# Patient Record
Sex: FEMALE | Race: WHITE | NOT HISPANIC OR LATINO | Employment: FULL TIME | URBAN - METROPOLITAN AREA
[De-identification: names, ages, dates, MRNs, and addresses within clinical notes are randomized per-mention and may not be internally consistent; named-entity substitution may affect disease eponyms.]

---

## 2023-03-29 PROBLEM — J30.9 ALLERGIC RHINITIS: Status: ACTIVE | Noted: 2023-03-29

## 2023-03-29 PROBLEM — M54.2 TRIGGER POINT WITH NECK PAIN: Status: ACTIVE | Noted: 2023-03-29

## 2023-03-29 PROBLEM — S16.1XXA CERVICAL STRAIN: Status: ACTIVE | Noted: 2023-03-29

## 2023-03-29 PROBLEM — M25.512 LEFT SHOULDER PAIN: Status: ACTIVE | Noted: 2023-03-29

## 2023-03-29 PROBLEM — R55 PRE-SYNCOPE: Status: ACTIVE | Noted: 2023-03-29

## 2023-03-29 PROBLEM — R53.82 CHRONIC FATIGUE: Status: ACTIVE | Noted: 2023-03-29

## 2023-03-29 PROBLEM — M41.9 SCOLIOSIS: Status: ACTIVE | Noted: 2023-03-29

## 2023-03-29 PROBLEM — M54.10 RADICULOPATHY: Status: ACTIVE | Noted: 2023-03-29

## 2023-03-29 PROBLEM — G44.309 POST-CONCUSSION HEADACHE: Status: ACTIVE | Noted: 2023-03-29

## 2023-03-29 PROBLEM — J06.9 UPPER RESPIRATORY INFECTION: Status: ACTIVE | Noted: 2023-03-29

## 2023-03-29 PROBLEM — F41.9 ANXIETY: Status: ACTIVE | Noted: 2023-03-29

## 2023-03-29 PROBLEM — Q76.1 KLIPPEL-FEIL SYNDROME: Status: ACTIVE | Noted: 2023-03-29

## 2023-03-29 PROBLEM — K58.2 IRRITABLE BOWEL SYNDROME WITH BOTH CONSTIPATION AND DIARRHEA: Status: ACTIVE | Noted: 2023-03-29
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
## 2023-03-29 PROBLEM — R59.0 CERVICAL LYMPHADENOPATHY: Status: ACTIVE | Noted: 2023-03-29

## 2023-03-29 PROBLEM — R09.A2 GLOBUS SENSATION: Status: ACTIVE | Noted: 2023-03-29

## 2023-03-29 PROBLEM — M54.81 BILATERAL OCCIPITAL NEURALGIA: Status: ACTIVE | Noted: 2023-03-29

## 2023-03-29 PROBLEM — K64.9 HEMORRHOIDS: Status: ACTIVE | Noted: 2023-03-29

## 2023-03-29 PROBLEM — H69.92 DYSFUNCTION OF LEFT EUSTACHIAN TUBE: Status: ACTIVE | Noted: 2023-03-29

## 2023-03-29 PROBLEM — R00.2 HEART PALPITATIONS: Status: ACTIVE | Noted: 2023-03-29

## 2023-03-29 PROBLEM — M79.18 MYOFASCIAL PAIN: Status: ACTIVE | Noted: 2023-03-29

## 2023-03-29 PROBLEM — G43.909 MIGRAINE HEADACHE: Status: ACTIVE | Noted: 2023-03-29

## 2023-03-29 PROBLEM — R13.10 DYSPHAGIA, UNSPECIFIED: Status: ACTIVE | Noted: 2023-03-29

## 2023-03-29 PROBLEM — S30.861A TICK BITE OF ABDOMEN: Status: ACTIVE | Noted: 2023-03-29

## 2023-03-29 PROBLEM — M54.2 CERVICAL PAIN: Status: ACTIVE | Noted: 2023-03-29

## 2023-03-29 PROBLEM — R93.89 ABNORMAL X-RAY OF NECK: Status: ACTIVE | Noted: 2023-03-29

## 2023-03-29 RX ORDER — FLUTICASONE PROPIONATE 50 MCG
2 SPRAY, SUSPENSION (ML) NASAL DAILY
COMMUNITY
Start: 2021-04-06 | End: 2024-04-16 | Stop reason: ALTCHOICE

## 2023-03-29 RX ORDER — FLUOXETINE 10 MG/1
10 CAPSULE ORAL NIGHTLY
COMMUNITY
Start: 2022-08-25 | End: 2024-04-16 | Stop reason: ALTCHOICE

## 2023-03-31 ENCOUNTER — OFFICE VISIT (OUTPATIENT)
Dept: PRIMARY CARE | Facility: CLINIC | Age: 33
End: 2023-03-31
Payer: COMMERCIAL

## 2023-03-31 VITALS
HEIGHT: 65 IN | DIASTOLIC BLOOD PRESSURE: 74 MMHG | SYSTOLIC BLOOD PRESSURE: 118 MMHG | BODY MASS INDEX: 19.66 KG/M2 | WEIGHT: 118 LBS | OXYGEN SATURATION: 96 % | HEART RATE: 86 BPM

## 2023-03-31 DIAGNOSIS — J01.00 ACUTE MAXILLARY SINUSITIS, RECURRENCE NOT SPECIFIED: Primary | ICD-10-CM

## 2023-03-31 DIAGNOSIS — R13.10 DYSPHAGIA, UNSPECIFIED TYPE: ICD-10-CM

## 2023-03-31 DIAGNOSIS — R09.A2 GLOBUS SENSATION: ICD-10-CM

## 2023-03-31 DIAGNOSIS — H69.92 DYSFUNCTION OF LEFT EUSTACHIAN TUBE: ICD-10-CM

## 2023-03-31 PROCEDURE — 1036F TOBACCO NON-USER: CPT | Performed by: FAMILY MEDICINE

## 2023-03-31 PROCEDURE — 99214 OFFICE O/P EST MOD 30 MIN: CPT | Performed by: FAMILY MEDICINE

## 2023-03-31 RX ORDER — METHYLPREDNISOLONE 4 MG/1
TABLET ORAL
Qty: 21 TABLET | Refills: 0 | Status: SHIPPED | OUTPATIENT
Start: 2023-03-31 | End: 2023-04-07

## 2023-03-31 RX ORDER — CYCLOBENZAPRINE HCL 10 MG
10 TABLET ORAL NIGHTLY PRN
Qty: 30 TABLET | Refills: 0 | Status: SHIPPED | OUTPATIENT
Start: 2023-03-31 | End: 2023-04-30

## 2023-03-31 RX ORDER — AZELASTINE 1 MG/ML
2 SPRAY, METERED NASAL NIGHTLY
Qty: 30 ML | Refills: 12 | Status: SHIPPED | OUTPATIENT
Start: 2023-03-31 | End: 2024-03-30

## 2023-03-31 ASSESSMENT — PATIENT HEALTH QUESTIONNAIRE - PHQ9
SUM OF ALL RESPONSES TO PHQ9 QUESTIONS 1 AND 2: 2
1. LITTLE INTEREST OR PLEASURE IN DOING THINGS: SEVERAL DAYS
10. IF YOU CHECKED OFF ANY PROBLEMS, HOW DIFFICULT HAVE THESE PROBLEMS MADE IT FOR YOU TO DO YOUR WORK, TAKE CARE OF THINGS AT HOME, OR GET ALONG WITH OTHER PEOPLE: SOMEWHAT DIFFICULT
2. FEELING DOWN, DEPRESSED OR HOPELESS: SEVERAL DAYS

## 2023-03-31 NOTE — LETTER
March 31, 2023     Patient: Hellen Stein   YOB: 1990   Date of Visit: 3/31/2023       To Whom It May Concern:    Hellen Stein was seen in my clinic on 3/31/2023 at 9:40 am. Please excuse Hellen for her absence from work on this day to make the appointment and for treatment of acute illness.     If you have any questions or concerns, please don't hesitate to call.         Sincerely,         Meredith Juarez DO, MSMEd, ABOM  Board Certified Family Physician   [address] [place] [address].   [address]  [place] [address]  P: [phone]  F: [phone]

## 2023-03-31 NOTE — PATIENT INSTRUCTIONS
Off work until Monday 4/3/23    Contienu the antibiotic until gone.     Start the Steroid in the AM for inflammation     Start the nasal spray in the PM     Also use the muscle relaxer at bedtime for the neck and shoulder pain .     Referred to ENT for the painful swallowing and right anterior  neck lymph nodes.     Follow up as needed for anxiety and follow up

## 2023-03-31 NOTE — PROGRESS NOTES
Subjective   Hellen Stein is a 32 y.o. female who presents for Earache (Left ear pain, migraine, neck pain, tenderness in hearing, sob x 1 week. Went to ER 2 days ago dx with Otitis media and Pharyngitis. Given Amoxicillin).    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   NKDA    #) left ear pain and Sore throat   - present to ER on 3/29/23 with sore throat and left ear pain x 2 days. Throat culture and covid swab - NEG   Patient denies any fever, chills, shortness of breath, chest  pain, nausea, vomiting, diarrhea, or dizziness.   - started on amoxicillin and not any better   - thoart is still pain full   - really fatigued  - woke up last night in sweats   - more left side headache, into the neck and shoudler/ arm   - some neck pain on the left   - and the left ear is muffled,        #) pre-syncope  - never happened before   - lasted about 20 mins  - still having a headache - band like headache   - yesterday while sitting outside  - diffuse sweating, numbness, throat closed up   - low appetite - makes her nauseated  - anxious but tired   - did see [facility]- ENT, will request the records.     #)   - lots of loss in the last couple of years   - Patient's stress level has increase in last 4 months - ( grandpa has cancer, grandma and great uncle passed away) - feels like crawling out of skin, anxiety     #) Eustachian tube dysfunction, left side- got fullness after the pre-syncope episode   thinks she is allergic to cats   eyes swelling   not currently taking any thing for allergies   no nasal congestion   some slight frontal sinus headaches    #) plugged left ear- the same   - dx with TMJ    #) angry- irritable from pain   - has children   -  " from children's father  - 7 yr old is transitioning very difficultly, acting out   - pain is causing her to angry   - finally agreeable to try lexapro -- then switched to paxil- didn’t like it   - denies HI/SI     #) Preventive:  Smoker: no  Etoh: occasional  BMI: 24  BP: 118/78, 115/77, 133/82  Fam h/o: mother depression  Vaccinations: unsure  PAP: yes going next week   Mammogram: n/a  Colonoscopy: n/a  DEXA: n/a  Fasting blood work: > 1 yr  Last eye exam: utd   Last dental Exam: utd  Slight scoliosis, has been to a chiropractor         ROS was completed and all systems are negative with the exception of what was noted in the the HPI.     Objective     /74   Pulse 86   Ht 1.651 m (5' 5\")   Wt 53.5 kg (118 lb)   SpO2 96%   BMI 19.64 kg/m²      Physical Exam    Assessment/Plan   Problem List Items Addressed This Visit          Nervous    Globus sensation    Relevant Orders    Referral to ENT       Digestive    Dysphagia, unspecified    Relevant Orders    Referral to ENT       Other    Dysfunction of left eustachian tube    Relevant Orders    Referral to ENT     Other Visit Diagnoses       Acute maxillary sinusitis, recurrence not specified    -  Primary    Relevant Medications    methylPREDNISolone (Medrol Dospak) 4 mg tablets    azelastine (Astelin) 137 mcg (0.1 %) nasal spray    cyclobenzaprine (Flexeril) 10 mg tablet        Off work until Monday 4/3/23    Contienu the antibiotic until gone.     Start the Steroid in the AM for inflammation     Start the nasal spray in the PM     Also use the muscle relaxer at bedtime for the neck and shoulder pain .     Referred to ENT for the painful swallowing and right anterior  neck lymph nodes.     We could still consider a trial of the fluoxetine at bedtime for the anxiety and to help with sleep and appetite.  We can address this at your next follow up     Please stay hydrated. and try to get some electrolytes in your life (propel, Gatorade, Liquid IV or " Pedialyte, etc.)     Please follow up with your ENT- referral given today      Followup in 1 month for follow up or sooner as needed.       Meredith Juarez DO, [facility], [address]   [place] [address]  Ph. [phone]  Fx. [phone]

## 2023-05-18 ENCOUNTER — TELEPHONE (OUTPATIENT)
Dept: PRIMARY CARE | Facility: CLINIC | Age: 33
End: 2023-05-18
Payer: COMMERCIAL

## 2023-05-18 DIAGNOSIS — H00.019 HORDEOLUM EXTERNUM, UNSPECIFIED LATERALITY: Primary | ICD-10-CM

## 2023-05-18 RX ORDER — ERYTHROMYCIN 5 MG/G
OINTMENT OPHTHALMIC
Qty: 3.5 G | Refills: 0 | Status: SHIPPED | OUTPATIENT
Start: 2023-05-18 | End: 2024-02-26 | Stop reason: WASHOUT

## 2023-05-18 NOTE — TELEPHONE ENCOUNTER
Hellen calling because she says she has a stye in her eye since yesterday.  It has gotten bigger today and she is wondering besides warm compresses if Dr Juarez recommends anything else?

## 2023-10-05 ENCOUNTER — TELEPHONE (OUTPATIENT)
Age: 33
End: 2023-10-05

## 2023-12-22 NOTE — PROGRESS NOTES
genitalia:   No lesions      Urethra:   No lesions    Bladder:  Nontender to palpation    Vagina: No lesions               No discharge    Cervix: No lesions               Uterus[de-identified] ANTE  -verted                                             ___10____    weeks sized                                                      Mobile                                                                         Smooth                                    Nontender                 Adnexa:  Nontender                                                                               No masses                                                                                                   Cheli Crease was seen today for annual exam.    Diagnoses and all orders for this visit:    Cervical cancer screening  -     PAP SMEAR; Future    Reactive depression    Other orders  -     hydrOXYzine pamoate (VISTARIL) 25 MG capsule; Take 1 capsule by mouth 4 times daily as needed for Itching Risk of birth defects reviewed. Return in about 1 year (around 12/28/2024).      Néstor Weldon MD

## 2023-12-28 ENCOUNTER — OFFICE VISIT (OUTPATIENT)
Age: 33
End: 2023-12-28
Payer: COMMERCIAL

## 2023-12-28 VITALS
SYSTOLIC BLOOD PRESSURE: 136 MMHG | HEART RATE: 62 BPM | HEIGHT: 66 IN | BODY MASS INDEX: 18.8 KG/M2 | DIASTOLIC BLOOD PRESSURE: 78 MMHG | WEIGHT: 117 LBS

## 2023-12-28 DIAGNOSIS — F32.9 REACTIVE DEPRESSION: ICD-10-CM

## 2023-12-28 DIAGNOSIS — Z12.4 CERVICAL CANCER SCREENING: Primary | ICD-10-CM

## 2023-12-28 PROCEDURE — G8427 DOCREV CUR MEDS BY ELIG CLIN: HCPCS | Performed by: LEGAL MEDICINE

## 2023-12-28 PROCEDURE — G8420 CALC BMI NORM PARAMETERS: HCPCS | Performed by: LEGAL MEDICINE

## 2023-12-28 PROCEDURE — G8484 FLU IMMUNIZE NO ADMIN: HCPCS | Performed by: LEGAL MEDICINE

## 2023-12-28 PROCEDURE — 4004F PT TOBACCO SCREEN RCVD TLK: CPT | Performed by: LEGAL MEDICINE

## 2023-12-28 PROCEDURE — 99395 PREV VISIT EST AGE 18-39: CPT | Performed by: LEGAL MEDICINE

## 2023-12-28 PROCEDURE — 99213 OFFICE O/P EST LOW 20 MIN: CPT | Performed by: LEGAL MEDICINE

## 2023-12-28 RX ORDER — PNV NO.95/FERROUS FUM/FOLIC AC 28MG-0.8MG
1 TABLET ORAL DAILY
Qty: 30 TABLET | Refills: 5 | Status: SHIPPED | OUTPATIENT
Start: 2023-12-28

## 2023-12-28 RX ORDER — HYDROXYZINE PAMOATE 25 MG/1
25 CAPSULE ORAL 4 TIMES DAILY PRN
Qty: 50 CAPSULE | Refills: 0 | Status: SHIPPED | OUTPATIENT
Start: 2023-12-28

## 2024-01-04 LAB — GYNECOLOGY CYTOLOGY REPORT: NORMAL

## 2024-02-06 ENCOUNTER — OFFICE VISIT (OUTPATIENT)
Dept: PRIMARY CARE | Facility: CLINIC | Age: 34
End: 2024-02-06
Payer: COMMERCIAL

## 2024-02-06 ENCOUNTER — HOSPITAL ENCOUNTER (OUTPATIENT)
Dept: RADIOLOGY | Facility: CLINIC | Age: 34
Discharge: HOME | End: 2024-02-06
Payer: COMMERCIAL

## 2024-02-06 VITALS
BODY MASS INDEX: 19.66 KG/M2 | SYSTOLIC BLOOD PRESSURE: 136 MMHG | OXYGEN SATURATION: 98 % | DIASTOLIC BLOOD PRESSURE: 80 MMHG | WEIGHT: 118 LBS | HEART RATE: 79 BPM | HEIGHT: 65 IN

## 2024-02-06 DIAGNOSIS — R68.89 FLU-LIKE SYMPTOMS: ICD-10-CM

## 2024-02-06 DIAGNOSIS — R53.83 FATIGUE, UNSPECIFIED TYPE: ICD-10-CM

## 2024-02-06 DIAGNOSIS — R21 MALAR RASH: Primary | ICD-10-CM

## 2024-02-06 DIAGNOSIS — R79.89 LOW VITAMIN D LEVEL: ICD-10-CM

## 2024-02-06 DIAGNOSIS — Z00.00 PREVENTATIVE HEALTH CARE: ICD-10-CM

## 2024-02-06 DIAGNOSIS — M25.50 POLYARTHRALGIA: ICD-10-CM

## 2024-02-06 PROCEDURE — 1036F TOBACCO NON-USER: CPT | Performed by: FAMILY MEDICINE

## 2024-02-06 PROCEDURE — 99214 OFFICE O/P EST MOD 30 MIN: CPT | Performed by: FAMILY MEDICINE

## 2024-02-06 PROCEDURE — 87637 SARSCOV2&INF A&B&RSV AMP PRB: CPT

## 2024-02-06 PROCEDURE — 71046 X-RAY EXAM CHEST 2 VIEWS: CPT

## 2024-02-06 PROCEDURE — 71046 X-RAY EXAM CHEST 2 VIEWS: CPT | Performed by: RADIOLOGY

## 2024-02-06 RX ORDER — METHYLPREDNISOLONE 4 MG/1
TABLET ORAL
Qty: 21 TABLET | Refills: 0 | Status: SHIPPED | OUTPATIENT
Start: 2024-02-06 | End: 2024-02-13

## 2024-02-06 RX ORDER — BENZONATATE 100 MG/1
100 CAPSULE ORAL 3 TIMES DAILY PRN
Qty: 42 CAPSULE | Refills: 0 | Status: SHIPPED | OUTPATIENT
Start: 2024-02-06 | End: 2024-03-07

## 2024-02-06 NOTE — PATIENT INSTRUCTIONS
We will test you for RSV, COVID and FLU     Please start the Medrol dose pack and tessalon pearls for the cough    Please also get a chest xray today to rule out pneumonia    Please get updated fasting blood work     Off work this week Monday 2/5/24 through Friday 2/9/24, to return to work on Monday 2/12/24    Follow up in 3 months for your physical or sooner as needed

## 2024-02-06 NOTE — LETTER
February 6, 2024     Patient: Hellen Stein   YOB: 1990   Date of Visit: 2/6/2024       To Whom It May Concern:    Hellen Stein was seen in my clinic onOff work this week Monday 2/5/24 through Friday 2/9/24 for an acute illness. To return to work full duty on Monday 2/12/24.     If you have any questions or concerns, please don't hesitate to call.         Sincerely,       Meredith Juarez DO, MSMEd, ABOM  Board Certified Family Physician   [address] [place] [address] Rd.   [id number]  [place] [address]  P: [phone]  F: [phone]

## 2024-02-06 NOTE — PROGRESS NOTES
Subjective   Hellen Stein is a 33 y.o. female who presents for Nasal Congestion (Joint pain very sore,  feels like its in her chest).    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   NKDA    #)  started on Friday   - no N/V, no sore throat , congestion , headache  - tinnitus   Fever 101   - home neg COVID testing  Shortness breath   - some neck pain on the left   - and the left ear is muffled,        #) photosensitive rash   - knee and elbow pains  - fatigue with flares   - then with tinnitus   - gets these flares monthly     #) pre-syncope  - never happened before   - lasted about 20 mins  - still having a headache - band like headache   - yesterday while sitting outside  - diffuse sweating, numbness, throat closed up   - low appetite - makes her nauseated  - anxious but tired   - did see [facility]- ENT, will request the records.     #)   - lots of loss in the last couple of years   - Patient's stress level has increase in last 4 months - ( grandpa has cancer, grandma and great uncle passed away) - feels like crawling out of skin, anxiety     #) Eustachian tube dysfunction, left side- got fullness after the pre-syncope episode   thinks she is allergic to cats   eyes swelling   not currently taking any thing for allergies   no nasal congestion   some slight frontal sinus headaches    #) plugged left ear- the same   - dx with TMJ    #) angry- irritable from pain   - has children   -  from children's father  - 7 yr old is transitioning very difficultly, acting out   - pain is causing her to angry   - finally agreeable to try lexapro -- then switched to paxil- didn’t like it   - denies HI/SI     #) Preventive:  Smoker: no  Etoh:  "occasional  BMI: 24  BP: 118/78, 115/77, 133/82  Fam h/o: mother depression  Vaccinations: unsure  PAP: yes going next week   Mammogram: n/a  Colonoscopy: n/a  DEXA: n/a  Fasting blood work: > 1 yr  Last eye exam: utd   Last dental Exam: utd  Slight scoliosis, has been to a chiropractor         ROS was completed and all systems are negative with the exception of what was noted in the the HPI.     Objective     /80   Pulse 79   Ht 1.651 m (5' 5\")   Wt 53.5 kg (118 lb)   SpO2 98%   BMI 19.64 kg/m²      Physical Exam    Assessment/Plan   Problem List Items Addressed This Visit    None    We will test you for RSV, COVID and FLU     Please start the Medrol dose pack and tessalon pearls for the cough    Please also get a chest xray today to rule out pneumonia    Please get updated fasting blood work     Off work this week Monday 2/5/24 through Friday 2/9/24, to return to work on Monday 2/12/24    Follow up in 3 months for your physical or sooner as needed        Meredith Juarez DO, MSMed, ABOM  [address]   [address]   [place] [address]  Ph. [phone]  Fx. [phone]  "

## 2024-02-07 LAB
FLUAV RNA RESP QL NAA+PROBE: NOT DETECTED
FLUBV RNA RESP QL NAA+PROBE: NOT DETECTED
RSV RNA RESP QL NAA+PROBE: NOT DETECTED
SARS-COV-2 RNA RESP QL NAA+PROBE: NOT DETECTED

## 2024-02-09 ENCOUNTER — LAB (OUTPATIENT)
Dept: LAB | Facility: LAB | Age: 34
End: 2024-02-09
Payer: COMMERCIAL

## 2024-02-09 DIAGNOSIS — Z00.00 PREVENTATIVE HEALTH CARE: ICD-10-CM

## 2024-02-09 DIAGNOSIS — R21 MALAR RASH: ICD-10-CM

## 2024-02-09 DIAGNOSIS — R79.89 LOW VITAMIN D LEVEL: ICD-10-CM

## 2024-02-09 DIAGNOSIS — M25.50 POLYARTHRALGIA: ICD-10-CM

## 2024-02-09 DIAGNOSIS — R53.83 FATIGUE, UNSPECIFIED TYPE: ICD-10-CM

## 2024-02-09 LAB
ALBUMIN SERPL BCP-MCNC: 4.9 G/DL (ref 3.4–5)
ALP SERPL-CCNC: 51 U/L (ref 33–110)
ALT SERPL W P-5'-P-CCNC: 12 U/L (ref 7–45)
ANION GAP SERPL CALC-SCNC: 11 MMOL/L (ref 10–20)
APPEARANCE UR: ABNORMAL
AST SERPL W P-5'-P-CCNC: 15 U/L (ref 9–39)
BACTERIA #/AREA URNS AUTO: ABNORMAL /HPF
BASOPHILS # BLD AUTO: 0.03 X10*3/UL (ref 0–0.1)
BASOPHILS NFR BLD AUTO: 0.8 %
BILIRUB SERPL-MCNC: 0.6 MG/DL (ref 0–1.2)
BILIRUB UR STRIP.AUTO-MCNC: NEGATIVE MG/DL
BUN SERPL-MCNC: 12 MG/DL (ref 6–23)
CALCIUM SERPL-MCNC: 9.5 MG/DL (ref 8.6–10.3)
CHLORIDE SERPL-SCNC: 102 MMOL/L (ref 98–107)
CHOLEST SERPL-MCNC: 161 MG/DL (ref 0–199)
CHOLESTEROL/HDL RATIO: 3.7
CO2 SERPL-SCNC: 29 MMOL/L (ref 21–32)
COLOR UR: YELLOW
CREAT SERPL-MCNC: 0.76 MG/DL (ref 0.5–1.05)
CRP SERPL-MCNC: <0.1 MG/DL
EGFRCR SERPLBLD CKD-EPI 2021: >90 ML/MIN/1.73M*2
EOSINOPHIL # BLD AUTO: 0.08 X10*3/UL (ref 0–0.7)
EOSINOPHIL NFR BLD AUTO: 2 %
ERYTHROCYTE [DISTWIDTH] IN BLOOD BY AUTOMATED COUNT: 11.9 % (ref 11.5–14.5)
ERYTHROCYTE [SEDIMENTATION RATE] IN BLOOD BY WESTERGREN METHOD: 7 MM/H (ref 0–20)
GLUCOSE SERPL-MCNC: 86 MG/DL (ref 74–99)
GLUCOSE UR STRIP.AUTO-MCNC: NEGATIVE MG/DL
HCT VFR BLD AUTO: 48.1 % (ref 36–46)
HDLC SERPL-MCNC: 43.1 MG/DL
HGB BLD-MCNC: 15.7 G/DL (ref 12–16)
IMM GRANULOCYTES # BLD AUTO: 0.03 X10*3/UL (ref 0–0.7)
IMM GRANULOCYTES NFR BLD AUTO: 0.8 % (ref 0–0.9)
IRON SATN MFR SERPL: 53 % (ref 25–45)
IRON SERPL-MCNC: 184 UG/DL (ref 35–150)
KETONES UR STRIP.AUTO-MCNC: NEGATIVE MG/DL
LDLC SERPL CALC-MCNC: 107 MG/DL
LEUKOCYTE ESTERASE UR QL STRIP.AUTO: ABNORMAL
LYMPHOCYTES # BLD AUTO: 1.74 X10*3/UL (ref 1.2–4.8)
LYMPHOCYTES NFR BLD AUTO: 43.6 %
MCH RBC QN AUTO: 30.1 PG (ref 26–34)
MCHC RBC AUTO-ENTMCNC: 32.6 G/DL (ref 32–36)
MCV RBC AUTO: 92 FL (ref 80–100)
MONOCYTES # BLD AUTO: 0.37 X10*3/UL (ref 0.1–1)
MONOCYTES NFR BLD AUTO: 9.3 %
MUCOUS THREADS #/AREA URNS AUTO: ABNORMAL /LPF
NEUTROPHILS # BLD AUTO: 1.74 X10*3/UL (ref 1.2–7.7)
NEUTROPHILS NFR BLD AUTO: 43.5 %
NITRITE UR QL STRIP.AUTO: NEGATIVE
NON HDL CHOLESTEROL: 118 MG/DL (ref 0–149)
NRBC BLD-RTO: 0 /100 WBCS (ref 0–0)
PH UR STRIP.AUTO: 7 [PH]
PLATELET # BLD AUTO: 284 X10*3/UL (ref 150–450)
POTASSIUM SERPL-SCNC: 4 MMOL/L (ref 3.5–5.3)
PROT SERPL-MCNC: 7.6 G/DL (ref 6.4–8.2)
PROT UR STRIP.AUTO-MCNC: ABNORMAL MG/DL
RBC # BLD AUTO: 5.22 X10*6/UL (ref 4–5.2)
RBC # UR STRIP.AUTO: NEGATIVE /UL
RBC #/AREA URNS AUTO: ABNORMAL /HPF
SODIUM SERPL-SCNC: 138 MMOL/L (ref 136–145)
SP GR UR STRIP.AUTO: 1.02
SQUAMOUS #/AREA URNS AUTO: ABNORMAL /HPF
TIBC SERPL-MCNC: 350 UG/DL (ref 240–445)
TRIGL SERPL-MCNC: 56 MG/DL (ref 0–149)
TSH SERPL-ACNC: 1.23 MIU/L (ref 0.44–3.98)
UIBC SERPL-MCNC: 166 UG/DL (ref 110–370)
UROBILINOGEN UR STRIP.AUTO-MCNC: <2 MG/DL
VLDL: 11 MG/DL (ref 0–40)
WBC # BLD AUTO: 4 X10*3/UL (ref 4.4–11.3)
WBC #/AREA URNS AUTO: >50 /HPF

## 2024-02-09 PROCEDURE — 81001 URINALYSIS AUTO W/SCOPE: CPT

## 2024-02-09 PROCEDURE — 86431 RHEUMATOID FACTOR QUANT: CPT

## 2024-02-09 PROCEDURE — 85652 RBC SED RATE AUTOMATED: CPT

## 2024-02-09 PROCEDURE — 82607 VITAMIN B-12: CPT

## 2024-02-09 PROCEDURE — 80053 COMPREHEN METABOLIC PANEL: CPT

## 2024-02-09 PROCEDURE — 86140 C-REACTIVE PROTEIN: CPT

## 2024-02-09 PROCEDURE — 86038 ANTINUCLEAR ANTIBODIES: CPT

## 2024-02-09 PROCEDURE — 80061 LIPID PANEL: CPT

## 2024-02-09 PROCEDURE — 84443 ASSAY THYROID STIM HORMONE: CPT

## 2024-02-09 PROCEDURE — 83550 IRON BINDING TEST: CPT

## 2024-02-09 PROCEDURE — 36415 COLL VENOUS BLD VENIPUNCTURE: CPT

## 2024-02-09 PROCEDURE — 83540 ASSAY OF IRON: CPT

## 2024-02-09 PROCEDURE — 82306 VITAMIN D 25 HYDROXY: CPT

## 2024-02-09 PROCEDURE — 85025 COMPLETE CBC W/AUTO DIFF WBC: CPT

## 2024-02-09 PROCEDURE — 86235 NUCLEAR ANTIGEN ANTIBODY: CPT

## 2024-02-09 PROCEDURE — 86225 DNA ANTIBODY NATIVE: CPT

## 2024-02-10 LAB
25(OH)D3 SERPL-MCNC: 39 NG/ML (ref 30–100)
RHEUMATOID FACT SER NEPH-ACNC: <10 IU/ML (ref 0–15)
VIT B12 SERPL-MCNC: 289 PG/ML (ref 211–911)

## 2024-02-12 LAB
ANA PATTERN: ABNORMAL
ANA SER QL HEP2 SUBST: POSITIVE
ANA TITR SER IF: ABNORMAL {TITER}
CENTROMERE B AB SER-ACNC: <0.2 AI
CHROMATIN AB SERPL-ACNC: <0.2 AI
DSDNA AB SER-ACNC: <1 IU/ML
ENA JO1 AB SER QL IA: <0.2 AI
ENA RNP AB SER IA-ACNC: 0.4 AI
ENA SCL70 AB SER QL IA: <0.2 AI
ENA SM AB SER IA-ACNC: <0.2 AI
ENA SM+RNP AB SER QL IA: <0.2 AI
ENA SS-A AB SER IA-ACNC: 3.4 AI
ENA SS-B AB SER IA-ACNC: <0.2 AI
RIBOSOMAL P AB SER-ACNC: <0.2 AI

## 2024-02-18 DIAGNOSIS — R21 MALAR RASH: ICD-10-CM

## 2024-02-18 DIAGNOSIS — R76.8 SS-A ANTIBODY POSITIVE: Primary | ICD-10-CM

## 2024-02-18 DIAGNOSIS — R53.83 FATIGUE, UNSPECIFIED TYPE: ICD-10-CM

## 2024-02-18 DIAGNOSIS — M25.50 POLYARTHRALGIA: ICD-10-CM

## 2024-02-25 DIAGNOSIS — N30.00 ACUTE CYSTITIS WITHOUT HEMATURIA: Primary | ICD-10-CM

## 2024-02-25 RX ORDER — SULFAMETHOXAZOLE AND TRIMETHOPRIM 800; 160 MG/1; MG/1
1 TABLET ORAL 2 TIMES DAILY
Qty: 28 TABLET | Refills: 0 | Status: SHIPPED | OUTPATIENT
Start: 2024-02-25 | End: 2024-03-10

## 2024-02-26 ENCOUNTER — TELEMEDICINE (OUTPATIENT)
Dept: PRIMARY CARE | Facility: CLINIC | Age: 34
End: 2024-02-26
Payer: COMMERCIAL

## 2024-02-26 DIAGNOSIS — R53.82 CHRONIC FATIGUE: Primary | ICD-10-CM

## 2024-02-26 PROCEDURE — 99213 OFFICE O/P EST LOW 20 MIN: CPT | Performed by: NURSE PRACTITIONER

## 2024-02-26 PROCEDURE — 1036F TOBACCO NON-USER: CPT | Performed by: NURSE PRACTITIONER

## 2024-02-26 NOTE — PROGRESS NOTES
Subjective   Patient ID: Hellen Stein is a 33 y.o. female who presents for No chief complaint on file..    This is a virtual visit with video.  Patient has requested and is agreeable to be on video. I performed this visit using real-time telehealth tools; including audio/video connection between the pt. and I at [facility].        33-year-old female patient of Dr. Juarez, called office this morning asking for a letter because she missed work today.  Her PCP is out of office and I am covering.  She states that she has been having a lot of flares lately. Waiting to see rheum In April, Dr Cabrera.   Exhaustion, soreness and hot cheeks, inflammation in the shoulders. These are not new but ongoing s&s. Restless at HS.   Work- - walking a lot.  She woke up this morning just not feeling good and had to call off work           Review of Systems    Objective   There were no vitals taken for this visit.    Physical Exam  Constitutional:       Comments: No physical exam was completed she was seen via telehealth visit.  She was seen dressed and in her home         Assessment/Plan   Diagnoses and all orders for this visit:  Chronic fatigue  Comments:  Acute on chronic flare today not feeling well called off work.  Requires a note to give to her job for missing work

## 2024-02-26 NOTE — LETTER
Date: 2024  RE:  Hellen Stein  :  1990      To Whom It May Concern:    Our patient, Hellen, has been under our care for chronic health concerns that are in the process of being worked up and may return back to work without restrictions.    Their return to work date is:  2024    If you have questions concerning this patient's immediate care, please feel free to contact our office at [phone].    Sincerely,      Adelaide June, AUDRA-CNP

## 2024-03-05 ENCOUNTER — TELEMEDICINE (OUTPATIENT)
Dept: PRIMARY CARE | Facility: CLINIC | Age: 34
End: 2024-03-05
Payer: COMMERCIAL

## 2024-03-05 DIAGNOSIS — R53.82 CHRONIC FATIGUE: ICD-10-CM

## 2024-03-05 DIAGNOSIS — G44.209 ACUTE NON INTRACTABLE TENSION-TYPE HEADACHE: Primary | ICD-10-CM

## 2024-03-05 PROCEDURE — 99212 OFFICE O/P EST SF 10 MIN: CPT | Performed by: NURSE PRACTITIONER

## 2024-03-05 PROCEDURE — 1036F TOBACCO NON-USER: CPT | Performed by: NURSE PRACTITIONER

## 2024-03-05 RX ORDER — VIT C/E/ZN/COPPR/LUTEIN/ZEAXAN 250MG-90MG
25 CAPSULE ORAL DAILY
Qty: 30 CAPSULE | Refills: 11 | COMMUNITY
Start: 2024-03-05

## 2024-03-05 RX ORDER — LANOLIN ALCOHOL/MO/W.PET/CERES
1000 CREAM (GRAM) TOPICAL DAILY
Qty: 30 TABLET | Refills: 11 | COMMUNITY
Start: 2024-03-05

## 2024-03-05 NOTE — PROGRESS NOTES
"Hellen Stein is a 33 y.o. female who presents virtually today for a sick visit and requests a work note     I performed this visit using real-time telehealth tools, including an audio/video connection between Hellen Stein  and myself, Shanice Carreno CNP in the state of [place].  Consent obtained  Telemedicine appropriate evaluation completed.  Unable to perform complete physical exam due to virtual visit.      Chief Complaint   Patient presents with    Letter for School/Work    Headache       Pt states she is not sure what is going on with her, as she is still being \"worked up\" by PCP and waiting to est with Rheumatology (has appt 4/24/24), but she went outside yesterday for only 5 minutes in the sun and immediately developed a headache on the RIGHT side of her head and she had to call off of work yesterday and today. She is starting to feel better, but would like 1 more day off of work.    Pt denies any dizziness, lightheadedness, N/V  This does not appear to be a migraine  Pt has not taken OTC medication, other than the Vitamin D and B12 she was recently instructed to take.      Review of Systems  All 13 systems were reviewed and are within normal limits except positive and pertinent negative responses which are noted below or in HPI.      Objective   Vitals:  There were no vitals taken for this visit.      Current Outpatient Medications   Medication Instructions    azelastine (Astelin) 137 mcg (0.1 %) nasal spray 2 sprays, Each Nostril, Nightly, Use in each nostril as directed    benzonatate (TESSALON) 100 mg, oral, 3 times daily PRN, Do not crush or chew.    cholecalciferol (VITAMIN D3) 25 mcg, oral, Daily    cyanocobalamin (VITAMIN B-12) 1,000 mcg, oral, Daily    cyclobenzaprine (FLEXERIL) 10 mg, oral, Nightly PRN    FLUoxetine (PROZAC) 10 mg, oral, Nightly    fluticasone (Flonase) 50 mcg/actuation nasal spray 2 sprays, Each Nostril, Daily    sulfamethoxazole-trimethoprim (Bactrim DS) 800-160 mg tablet 1 " tablet, oral, 2 times daily         Physical Exam  Pulmonary:      Effort: Pulmonary effort is normal. No respiratory distress.   Neurological:      Mental Status: She is alert and oriented to person, place, and time.   Psychiatric:         Mood and Affect: Mood normal.         Assessment/Plan   Problem List Items Addressed This Visit             ICD-10-CM    Chronic fatigue R53.82    Relevant Medications    cyanocobalamin (Vitamin B-12) 1,000 mcg tablet    cholecalciferol (Vitamin D3) 25 MCG (1000 UT) capsule     Other Visit Diagnoses         Codes    Acute non intractable tension-type headache    -  Primary G44.209

## 2024-03-05 NOTE — PATIENT INSTRUCTIONS
Thank you for seeing me today.  It was a pleasure to meet you!    #HEADACHE  Tylenol 975 mg every 8 hours for pain  Drink at least 60 oz of water daily  Avoid stress    See Rheum as scheduled in April     Work noted posted to your My Chart

## 2024-03-05 NOTE — LETTER
March 5, 2024     Patient: Hellen Stein   YOB: 1990   Date of Visit: 3/5/2024       To Whom It May Concern:    Hellen Stein was seen virtually on 3/5/2024 at 3:40 pm. Please excuse Hellen for her absence from work on Monday 3/4/2024-Wednesday 3/6/2024 due to an acute illness.      If you have any questions or concerns, please don't hesitate to call.         Sincerely,     BIGG AMBROSE, MSN, APRN, FNP-BC  [address] [place] [address], [id number]  [place] [address]  PHONE: [phone]  FAX: [phone]          CC: No Recipients

## 2024-04-16 ENCOUNTER — OFFICE VISIT (OUTPATIENT)
Dept: RHEUMATOLOGY | Facility: CLINIC | Age: 34
End: 2024-04-16
Payer: COMMERCIAL

## 2024-04-16 VITALS — SYSTOLIC BLOOD PRESSURE: 122 MMHG | DIASTOLIC BLOOD PRESSURE: 70 MMHG | BODY MASS INDEX: 18.97 KG/M2 | WEIGHT: 114 LBS

## 2024-04-16 DIAGNOSIS — G89.29 OTHER CHRONIC PAIN: ICD-10-CM

## 2024-04-16 DIAGNOSIS — M35.9 UNDIFFERENTIATED CONNECTIVE TISSUE DISEASE (MULTI): Primary | ICD-10-CM

## 2024-04-16 PROCEDURE — 99205 OFFICE O/P NEW HI 60 MIN: CPT | Performed by: INTERNAL MEDICINE

## 2024-04-16 PROCEDURE — 99215 OFFICE O/P EST HI 40 MIN: CPT | Performed by: INTERNAL MEDICINE

## 2024-04-16 RX ORDER — CALCIUM CARBONATE 200(500)MG
1 TABLET,CHEWABLE ORAL DAILY
COMMUNITY

## 2024-04-16 RX ORDER — HYDROXYCHLOROQUINE SULFATE 200 MG/1
200 TABLET, FILM COATED ORAL DAILY
Qty: 30 TABLET | Refills: 3 | Status: SHIPPED | OUTPATIENT
Start: 2024-04-16 | End: 2024-10-13

## 2024-04-16 NOTE — PROGRESS NOTES
"NP ref per Dr. Juarez for evaluation and treatment of + MICKY /  SSA  C/O skin irritation and sensitivity / rash with itchiness off and on daily x 2 years.  Weight loss.  120 lb last year but today.  Stomach upset with eating gluten products. RUQ pain off and on.  Diarrhea / constipation off and on since childhood.  \" My Mom use to have to give me suppositories \"     HPI - she is here with +MICKY because of mult sx.  She has had rashes x 1 yr.  She gets rashes if she is in the sun for more than 2 min.  She said that her legs have a rash now because she drove here and was exposed to the sun - later clarified that she didn't have a rash but just her legs feel hot.  (she is wearing pants).  She says that she can eat sweet potatoes but not regular potatoes and can eat GF noodles but not reg noodles.  She has had chronic GI issues since she was a child, but she has had more problems recently.  She said there isn't much she can eat.  If she eats fruits and vegetables, \"I end up in a flare-up\" with food - she gets warmer - her temp goes up to 99.  She getrs redness on her face that can last a few hrs.  She has never seen derm.  She feels like her stomach \"is on fire\" - sometimes will have BM, sometimes she doesn't.  She said that her mother doesn't remember a lot of the details, so she tries not to ask her questions, but she had to have suppositories d/t constipation.  She gets intermittent constipation, but she sometimes gets diarrhea alt with constip.  Currently, her RUQ is \"on fire\" \"burning, dull, it feels like its full\"  Sometimes it feels like she has pulled muscles.  Her stomach feels warm to touch.   Pepcid \"takes the edge off\"  She used to have HA, but they aren't as bad since she stopped drinking pop, but then she said that she gets HA with flares, she gets migraines, and she took her BF's migraine med without relief.  She felt better when she vomited.   +sicca \"constantly\"   +mouth sores.  +raynauds \"a long time\"   " "+CP \"now that you say that, yeah.  I also get pain through my shoulder, but I also have fused discs, but I know a lot of inflammation on this side\"  She said her chiro told her that she had fused discs on her x-ray.  When I said that you can't see discs on an x-ray, she said it was an MRI.  She has chronic LBP \"I have scoliosis\"  She said if she doesn't see chiro, I can't walk.  She saw pain mgmt and had PT without relief.  If she lies on her back, her legs go numb.  \"If I have a lot of inflammation, my hands will go numb\"  I asked if she had any other pain and she said no, but she later told me all her joints hurt.  No resp.      Pain incr with any activity and with foods    FH - No arthritis or CTD  SH - former smoker.  No EtOH - she said that every joint is inflamed when she did.  She has a h/o heavy drinking >10 yrs ago.  +marijuana \"to sleep at night\"  No drugs    PE  Gen - NAD  HEENT - moist MM with exuberant salivary pooling (and pt does not have water with her).  Poor dentition  Neck - supple, no LAD  CV - RRR no r/m/g  Lungs - CTA  Abd - +BS  Extr - 2+ DP, PT, and rad pulses.  No c/c/e  Capillaroscopy - nl  Skin - red face, even in sun-spared areas, and upper chest.  No discrete rash seen anywhere  Psych - depressed affect  Neuro - nl strength.  Reflexes 2+ symmetric  Msk - no synovitis.  Few bony enl finger DIPs.  L pinky deformity - prev injury.  12 FM tender pts.  Diffuse shin tenderness    A/P - Pt with mult somatic complaints, sun sensitivity and rashes that can last hrs, chronic pain, and +MICKY  MICKY 1:320/SSA 3.4.  RF, ESR, CRP, CMP all nl.  Mild leukopenia  She may have a low -level CTD.  Trial of hcq - expl risks/benefits/yrly eye exam  I suspect that she also has FM and d/w pt - consider pain mgmt if sx continue  GI referral for ongoing sx  Consider derm if rash progresses  I cautioned pt about chiro neck adjustments  MRI c-spine shows \"coalition at the skull base without canal stenosis\"  She does " not have inflam changes on STIR imaging.  +bulging discs  Reeval 8 wks or sooner PRN

## 2024-04-16 NOTE — LETTER
April 16, 2024     Patient: Hellen Stein   YOB: 1990   Date of Visit: 4/16/2024       To Whom It May Concern:    Hellen Stein was seen in my clinic on 4/16/2024 at 1:30 pm. Please excuse Hellen for her absence from work on this day to make the appointment.    If you have any questions or concerns, please don't hesitate to call.         Sincerely,         Diana Mclaughlin MD        CC: No Recipients

## 2024-04-24 ENCOUNTER — APPOINTMENT (OUTPATIENT)
Dept: RHEUMATOLOGY | Facility: CLINIC | Age: 34
End: 2024-04-24
Payer: COMMERCIAL

## 2024-05-29 ENCOUNTER — TELEMEDICINE (OUTPATIENT)
Dept: PRIMARY CARE | Facility: CLINIC | Age: 34
End: 2024-05-29
Payer: COMMERCIAL

## 2024-05-29 DIAGNOSIS — K52.9 ACUTE GASTROENTERITIS: Primary | ICD-10-CM

## 2024-05-29 PROCEDURE — 1036F TOBACCO NON-USER: CPT | Performed by: NURSE PRACTITIONER

## 2024-05-29 PROCEDURE — 99212 OFFICE O/P EST SF 10 MIN: CPT | Performed by: NURSE PRACTITIONER

## 2024-05-29 NOTE — PATIENT INSTRUCTIONS
Thank you for seeing me today.  It was a pleasure to see you again!    #GASTROENTERITIS  Imodium for diarrhea  Drink extra water  Pepto-Bismol for upset stomach    Work note provided     RTC AS NEEDED

## 2024-05-29 NOTE — PROGRESS NOTES
Hellen Stein is a 33 y.o. female who presents virtually today for a same day sick visit    Chief Complaint   Patient presents with    GI Problem       Symptoms: Upset stomach, vomiting and diarrhea  Pt states she was vomiting and had diarrhea yesterday and had to call off of work, is back to work today, but needs a note for yesterday.    No fever  No chills  No vomiting today  Some diarrhea, not taking imodium      Allergies   Allergen Reactions    Latex Rash     bandaids    Rash from latex in bandaides       Review of Systems  ROS was completed and all systems are negative with the exception of what was noted in the the HPI.       Objective   Vitals:  There were no vitals taken for this visit.      Current Outpatient Medications   Medication Instructions    azelastine (Astelin) 137 mcg (0.1 %) nasal spray 2 sprays, Each Nostril, Nightly, Use in each nostril as directed    calcium carbonate (Tums) 200 mg calcium chewable tablet 1 tablet, oral, Daily    cholecalciferol (VITAMIN D3) 25 mcg, oral, Daily    cyanocobalamin (VITAMIN B-12) 1,000 mcg, oral, Daily    cyclobenzaprine (FLEXERIL) 10 mg, oral, Nightly PRN    hydroxychloroquine (PLAQUENIL) 200 mg, oral, Daily         Physical Exam  Pulmonary:      Effort: Pulmonary effort is normal.   Neurological:      Mental Status: She is alert and oriented to person, place, and time.   Psychiatric:         Thought Content: Thought content normal.         Assessment/Plan   Problem List Items Addressed This Visit    None  Visit Diagnoses         Codes    Acute gastroenteritis    -  Primary K52.9

## 2024-05-29 NOTE — LETTER
May 29, 2024     Patient: Hellen Stein   YOB: 1990   Date of Visit: 5/29/2024       To Whom It May Concern:    Hellen Stein was seen virtually on 5/29/2024 at 11:40 am. Please excuse Hellen for her absence from work on Tuesday May 28, 2024 for an acute illness.      If you have any questions or concerns, please don't hesitate to call.         Sincerely,         AUDRA Bower-CNP        CC: No Recipients

## 2024-05-30 DIAGNOSIS — R63.4 WEIGHT LOSS, UNINTENTIONAL: ICD-10-CM

## 2024-05-30 DIAGNOSIS — R13.10 DYSPHAGIA, UNSPECIFIED TYPE: Primary | ICD-10-CM

## 2024-06-10 NOTE — PROGRESS NOTES
History Of Present Illness  Hellen Stein is a 33 y.o. female presenting to GI clinic with a chief complaint of Constipation, diarrhea, abdominal pain, weight loss.  Has been having symptoms over the last year, but they increased in severity over the last 4 months.     Initially, patient noticed that anytime she would have anything with gluten or mac and cheese, she would get headaches.  Patient also has abdominal pain and feels like she is going to throw up when consuming things with dairy or gluten.  Sometimes she gets a temperature of 99.1 °F after eating.      Patient is having issues with loose diarrhea and will occasionally have a regular bm.  She states the more dairy and gluten she consumes, the more nausea and diarrhea she gets. Patient has not tried Imodium for her diarrhea.  Vomiting makes her nausea better, and her abdominal pain improves after she has a bowel movement.    Normally for breakfast she eats oatmeal, bananas, blueberries, scrambled eggs, but she had a waffle this morning and felt sick afterward.  Yesterday she ate a pretzel and ice cream and had abdominal symptoms with these.    Lately she has had a lot issues with knowing what to eat despite seeing an online nutritionist.  She feels like she is only able to eat certain things and is living on protein shakes. She can't do lactose free, only vegan shakes.  She is continuing to lose weight due to food avoidance because she does not want to get abdominal pain, nausea, and diarrhea.  Sugar, pasta and red sauces seem to make symptoms worse. Today she ate stuffed pepper soup and salad today for lunch and she is not sure which of the two made her stomach feel upset.  She just had a [employer] milk prior to appointment.    Patient also reports dysphagia to solids and pills for the last year.  Meats, breads get stuck sometimes, more so breads. She has pill dysphagia daily and her throat feels swollen.  If she eats and drinks after taking a pill, then  the pill will go down.  It has frequent globus sensation.  She gets acid reflux and regurgitation every once in awhile. She hasn't really had any heartburn since pregnancy.    She also notices rashes with alcohol, sun exposure.    As a child, she had issues with constipation and her mother would have to give her suppositories.  She is not having constipation at this time.      Social History  She reports that she has never smoked. She has never used smokeless tobacco. She reports that she does not currently use alcohol. She reports current drug use.  She does not take NSAIDs on a regular basis    Family History  Family History   Problem Relation Name Age of Onset    Hypertension Mother      Irritable bowel syndrome Mother      Hypertension Maternal Grandmother       The patient does not have a FH of CRC. she does not have a FH of IBD. Unknown paternal family history    Review of Systems   Constitutional:  Negative for fever (99.1 temp after eating).   HENT:  Positive for sore throat and trouble swallowing.    Gastrointestinal:  Positive for abdominal distention, abdominal pain, diarrhea, nausea and vomiting. Negative for anal bleeding, blood in stool, constipation and rectal pain.        See HPI   Musculoskeletal:  Positive for arthralgias.   Skin:  Positive for rash.   Neurological:  Positive for headaches.   All other systems reviewed and are negative.     Physical Exam  HENT:      Head: Normocephalic and atraumatic.      Mouth/Throat:      Comments: Poor dentition  Eyes:      Conjunctiva/sclera: Conjunctivae normal.      Pupils: Pupils are equal, round, and reactive to light.   Pulmonary:      Effort: Pulmonary effort is normal.   Abdominal:      General: Abdomen is flat. Bowel sounds are normal. There is no distension.      Palpations: Abdomen is soft. There is no mass.      Tenderness: There is no abdominal tenderness. There is no guarding or rebound.      Hernia: No hernia is present.   Musculoskeletal:         " General: Normal range of motion.      Cervical back: Normal range of motion.   Skin:     General: Skin is warm and dry.      Coloration: Skin is not jaundiced.   Neurological:      Mental Status: She is alert and oriented to person, place, and time. Mental status is at baseline.   Psychiatric:         Mood and Affect: Mood normal.         Behavior: Behavior normal.        Last Vital Signs  /67 (BP Location: Left arm, Patient Position: Sitting)   Pulse 63   Temp 37 °C (98.6 °F)   Resp 16   Ht 1.651 m (5' 5\")   Wt 50.7 kg (111 lb 12.8 oz)   SpO2 100%   BMI 18.60 kg/m²      Relevant Results  Lab Results   Component Value Date    WBC 4.0 (L) 02/09/2024    HGB 15.7 02/09/2024    HCT 48.1 (H) 02/09/2024    MCV 92 02/09/2024     02/09/2024      Lab Results   Component Value Date    GLUCOSE 86 02/09/2024    CALCIUM 9.5 02/09/2024     02/09/2024    K 4.0 02/09/2024    CO2 29 02/09/2024     02/09/2024    BUN 12 02/09/2024    CREATININE 0.76 02/09/2024      Lab Results   Component Value Date    ALT 12 02/09/2024    AST 15 02/09/2024    ALKPHOS 51 02/09/2024    BILITOT 0.6 02/09/2024      Lab Results   Component Value Date    IRON 184 (H) 02/09/2024    TIBC 350 02/09/2024    IRONSAT 53 (H) 02/09/2024    [id number] 289 02/09/2024       Lab Results   Component Value Date    CRP <0.10 02/09/2024    No results found for: \"LIPASE\" No results found for: \"HGBA1C\"   Lab Results   Component Value Date    TSH 1.23 02/09/2024      Weights   3/31/23 118 lbs     2/6/24 118  4/16/24 114  6/11/24 111    EGD/COLONOSCOPY  EGD-never  Colonoscopy-never    Assessment/Plan   33 y.o. female presenting to GI clinic with multiple abdominal complaints which seem to be present after consuming dairy, gluten, and sugars.  She has reported globus sensation and pill dysphagia with intermittent dysphagia to meats and breads x 1 year.  Patient currently is undergoing workup for suspected mixed connective tissue disorder with " rheumatology.    EGD for further evaluation of dysphagia.  She is aware that I do not have availability for this in [place] at the moment  She has had a documented 7 pound weight loss since February, referral to nutrition services placed  Serum celiac panel,   negative CRP February 2024, no indication to repeat  Infectious stool studies, fecal elastase, calprotectin for further evaluation of abdominal pain and diarrhea  Diagnostic colonoscopy pending calprotectin results  Trial Bentyl as needed abdominal cramping, pain  Imodium as needed diarrhea  Referral to allergy given her joint pains, rashes, food intolerances, may be related to connective tissue disease, but food allergy is in differential  Patient already has an appointment with Dr. Lu per the recommendation of her PCP    Problem List Items Addressed This Visit       Dysphagia, unspecified    Relevant Orders    Esophagogastroduodenoscopy (EGD)    Globus sensation    Irritable bowel syndrome with both constipation and diarrhea - Primary    Relevant Medications    dicyclomine (Bentyl) 10 mg capsule    loperamide (Imodium) 2 mg capsule    Other Relevant Orders    Referral to Nutrition Services     Other Visit Diagnoses       Undifferentiated connective tissue disease (Multi)        Relevant Orders    Referral to Allergy    Gastrointestinal food sensitivity        Relevant Orders    Referral to Nutrition Services    Referral to Allergy    Diarrhea, unspecified type        Relevant Orders    H. Pylori Antigen, Stool    Ova/Para + Giardia/Cryptosporidium Antigen    Pancreatic Elastase, Fecal    Stool Pathogen Panel, PCR    Celiac Panel    Calprotectin, Fecal    C. difficile, PCR    Referral to Allergy    Rash and nonspecific skin eruption        Relevant Orders    Referral to Allergy            Nusrat Romero, APRN-CNP

## 2024-06-11 ENCOUNTER — OFFICE VISIT (OUTPATIENT)
Dept: GASTROENTEROLOGY | Facility: CLINIC | Age: 34
End: 2024-06-11
Payer: COMMERCIAL

## 2024-06-11 ENCOUNTER — LAB (OUTPATIENT)
Dept: LAB | Facility: LAB | Age: 34
End: 2024-06-11
Payer: COMMERCIAL

## 2024-06-11 VITALS
HEART RATE: 63 BPM | DIASTOLIC BLOOD PRESSURE: 67 MMHG | SYSTOLIC BLOOD PRESSURE: 104 MMHG | TEMPERATURE: 98.6 F | OXYGEN SATURATION: 100 % | WEIGHT: 111.8 LBS | BODY MASS INDEX: 18.63 KG/M2 | RESPIRATION RATE: 16 BRPM | HEIGHT: 65 IN

## 2024-06-11 DIAGNOSIS — R19.7 DIARRHEA, UNSPECIFIED TYPE: ICD-10-CM

## 2024-06-11 DIAGNOSIS — T78.1XXA GASTROINTESTINAL FOOD SENSITIVITY: ICD-10-CM

## 2024-06-11 DIAGNOSIS — R13.19 ESOPHAGEAL DYSPHAGIA: ICD-10-CM

## 2024-06-11 DIAGNOSIS — K58.2 IRRITABLE BOWEL SYNDROME WITH BOTH CONSTIPATION AND DIARRHEA: Primary | ICD-10-CM

## 2024-06-11 DIAGNOSIS — R21 RASH AND NONSPECIFIC SKIN ERUPTION: ICD-10-CM

## 2024-06-11 DIAGNOSIS — M35.9 UNDIFFERENTIATED CONNECTIVE TISSUE DISEASE (MULTI): ICD-10-CM

## 2024-06-11 DIAGNOSIS — R09.A2 GLOBUS SENSATION: ICD-10-CM

## 2024-06-11 PROCEDURE — 83516 IMMUNOASSAY NONANTIBODY: CPT

## 2024-06-11 PROCEDURE — 36415 COLL VENOUS BLD VENIPUNCTURE: CPT

## 2024-06-11 PROCEDURE — 99204 OFFICE O/P NEW MOD 45 MIN: CPT

## 2024-06-11 PROCEDURE — 1036F TOBACCO NON-USER: CPT

## 2024-06-11 RX ORDER — PNV NO.95/FERROUS FUM/FOLIC AC 28MG-0.8MG
1 TABLET ORAL
COMMUNITY
Start: 2023-12-28

## 2024-06-11 RX ORDER — DICYCLOMINE HYDROCHLORIDE 10 MG/1
10 CAPSULE ORAL 4 TIMES DAILY PRN
Qty: 60 CAPSULE | Refills: 11 | Status: SHIPPED | OUTPATIENT
Start: 2024-06-11 | End: 2025-06-11

## 2024-06-11 RX ORDER — HYDROCORTISONE 25 MG/G
CREAM TOPICAL
COMMUNITY
Start: 2021-04-16

## 2024-06-11 RX ORDER — HYDROXYZINE PAMOATE 25 MG/1
25 CAPSULE ORAL
COMMUNITY
Start: 2023-12-28

## 2024-06-11 RX ORDER — LOPERAMIDE HYDROCHLORIDE 2 MG/1
2 CAPSULE ORAL 4 TIMES DAILY PRN
Qty: 30 CAPSULE | Refills: 2 | Status: SHIPPED | OUTPATIENT
Start: 2024-06-11

## 2024-06-11 ASSESSMENT — ENCOUNTER SYMPTOMS
ARTHRALGIAS: 1
ROS GI COMMENTS: SEE HPI
ANAL BLEEDING: 0
ABDOMINAL DISTENTION: 1
BLOOD IN STOOL: 0
NAUSEA: 1
VOMITING: 1
HEADACHES: 1
ABDOMINAL PAIN: 1
TROUBLE SWALLOWING: 1
CONSTIPATION: 0
RECTAL PAIN: 0
SORE THROAT: 1
FEVER: 0
DIARRHEA: 1

## 2024-06-11 ASSESSMENT — PAIN SCALES - GENERAL: PAINLEVEL: 0-NO PAIN

## 2024-06-11 ASSESSMENT — PATIENT HEALTH QUESTIONNAIRE - PHQ9
2. FEELING DOWN, DEPRESSED OR HOPELESS: NOT AT ALL
1. LITTLE INTEREST OR PLEASURE IN DOING THINGS: NOT AT ALL
SUM OF ALL RESPONSES TO PHQ9 QUESTIONS 1 AND 2: 0

## 2024-06-11 NOTE — PATIENT INSTRUCTIONS
Stool studies  Nutritional services through   Celiac blood test  EGD, possible colonoscopy depending on stool test results  Start     loperamide (Imodium) 2 mg capsule          Take 1 capsule (2 mg) by mouth 4 times a day as needed for diarrhea.,       dicyclomine (Bentyl) 10 mg capsule          Take 1 capsule (10 mg) by mouth 4 times a day as needed (abdominal pain, diarrhea, cramping   Referral to allergy and immunology  Follow up after testing is complete

## 2024-06-11 NOTE — LETTER
June 12, 2024     Patient: Hellen Stein   YOB: 1990   Date of Visit: 6/11/2024       To Whom It May Concern:    Heleln Stein was seen in my clinic on 6/11/2024 at 3:00 pm. Please excuse Hellen for her absence from work on this day to make the appointment.    If you have any questions or concerns, please don't hesitate to call.         Sincerely,         AUDRA Cardenas-CNP        CC: No Recipients

## 2024-06-12 ENCOUNTER — APPOINTMENT (OUTPATIENT)
Dept: RHEUMATOLOGY | Facility: CLINIC | Age: 34
End: 2024-06-12
Payer: COMMERCIAL

## 2024-06-12 LAB
GLIADIN PEPTIDE IGA SER IA-ACNC: <1 U/ML
TTG IGA SER IA-ACNC: <1 U/ML

## 2024-06-13 LAB
GLIADIN PEPTIDE IGG SER IA-ACNC: <0.56 FLU (ref 0–4.99)
TTG IGG SER IA-ACNC: <0.82 FLU (ref 0–4.99)

## 2024-09-10 ENCOUNTER — OFFICE VISIT (OUTPATIENT)
Dept: URGENT CARE | Facility: URGENT CARE | Age: 34
End: 2024-09-10
Payer: COMMERCIAL

## 2024-09-10 VITALS
SYSTOLIC BLOOD PRESSURE: 135 MMHG | OXYGEN SATURATION: 99 % | HEART RATE: 65 BPM | WEIGHT: 108.69 LBS | RESPIRATION RATE: 18 BRPM | DIASTOLIC BLOOD PRESSURE: 89 MMHG | TEMPERATURE: 98.1 F | BODY MASS INDEX: 18.09 KG/M2

## 2024-09-10 DIAGNOSIS — U07.1 COVID: Primary | ICD-10-CM

## 2024-09-10 RX ORDER — FLUTICASONE PROPIONATE 50 MCG
1 SPRAY, SUSPENSION (ML) NASAL DAILY
Qty: 16 G | Refills: 0 | Status: SHIPPED | OUTPATIENT
Start: 2024-09-10 | End: 2025-09-10

## 2024-09-10 ASSESSMENT — ENCOUNTER SYMPTOMS
VOMITING: 0
CHEST TIGHTNESS: 0
DIZZINESS: 0
FATIGUE: 0
SHORTNESS OF BREATH: 0
FEVER: 0
CHILLS: 0
ABDOMINAL PAIN: 1
SORE THROAT: 1
COUGH: 1
SINUS PRESSURE: 1
HEADACHES: 1

## 2024-09-10 NOTE — PROGRESS NOTES
Subjective   Patient ID: Hellen Stein is a 34 y.o. female. They present today with a chief complaint of Sore Throat, Cough, Headache, Generalized Body Aches, Abdominal Pain, Earache, and Request For Covid Testing (Pt had positive home test today X5 days of symptoms ).    History of Present Illness  Patient is a 34-year-old female presenting to the clinic with 5 days of symptoms of cough nonproductive.  Sore throat worse to swallow.  Bilateral ear pressure.  Patient states that she decided to test herself for COVID today at home and was positive.  Patient denies chest pain, shortness of breath or difficulty breathing.  Patient states she needs a work note because she took today after work.      Sore Throat   Associated symptoms include abdominal pain, coughing, ear pain and headaches. Pertinent negatives include no ear discharge, shortness of breath or vomiting.   Cough  Associated symptoms include ear pain, headaches and a sore throat. Pertinent negatives include no chest pain, chills, fever or shortness of breath.   Headache  Associated symptoms: abdominal pain, cough, ear pain, sinus pressure and sore throat    Associated symptoms: no dizziness, no fatigue, no fever and no vomiting    Abdominal Pain  Associated symptoms include headaches. Pertinent negatives include no fever or vomiting.   Earache   Associated symptoms include abdominal pain, coughing, headaches and a sore throat. Pertinent negatives include no ear discharge or vomiting.       Past Medical History  Allergies as of 09/10/2024 - Reviewed 09/10/2024   Allergen Reaction Noted    Latex Rash 07/22/2013       (Not in a hospital admission)       No past medical history on file.    Past Surgical History:   Procedure Laterality Date    INNER EAR SURGERY  05/02/2018    Inner Ear Surgery        reports that she has never smoked. She has never used smokeless tobacco. She reports that she does not currently use alcohol. She reports current drug use.    Review  of Systems  Review of Systems   Constitutional:  Negative for chills, fatigue and fever.   HENT:  Positive for ear pain, sinus pressure and sore throat. Negative for ear discharge.    Respiratory:  Positive for cough. Negative for chest tightness and shortness of breath.    Cardiovascular:  Negative for chest pain.   Gastrointestinal:  Positive for abdominal pain. Negative for vomiting.   Neurological:  Positive for headaches. Negative for dizziness.                                  Objective    Vitals:    09/10/24 1414   BP: 135/89   BP Location: Left arm   Patient Position: Sitting   Pulse: 65   Resp: 18   Temp: 36.7 °C (98.1 °F)   TempSrc: Oral   SpO2: 99%   Weight: 49.3 kg (108 lb 11 oz)     No LMP recorded.    Physical Exam  Constitutional:       General: She is not in acute distress.     Appearance: Normal appearance. She is normal weight.   HENT:      Head: Normocephalic and atraumatic.      Right Ear: Tympanic membrane, ear canal and external ear normal.      Left Ear: Tympanic membrane, ear canal and external ear normal.      Mouth/Throat:      Mouth: Mucous membranes are moist.   Cardiovascular:      Rate and Rhythm: Normal rate and regular rhythm.      Heart sounds: Normal heart sounds. No murmur heard.     No friction rub. No gallop.   Pulmonary:      Effort: Pulmonary effort is normal. No respiratory distress.      Breath sounds: Normal breath sounds. No stridor. No wheezing, rhonchi or rales.   Neurological:      General: No focal deficit present.      Mental Status: She is alert and oriented to person, place, and time. Mental status is at baseline.         Procedures    Point of Care Test & Imaging Results from this visit  Results for orders placed or performed in visit on 06/11/24   Tissue Transglutaminase IgA   Result Value Ref Range    Tissue Transglutaminase, IgA <1.0 <15.0 U/mL   Tissue Transglutaminase IgG   Result Value Ref Range    Tissue Transglutamase IgG <0.82 0.00 - 4.99 FLU   Deamidated  Gliadin Antibody IgA   Result Value Ref Range    Deamidated Gliadin Antibody IgA <1.0 <15.0 U/mL   Deamidated Gliadin Antibody IgG   Result Value Ref Range    Deamidated Gliadin Antibody IgG <0.56 0.00 - 4.99 FLU     No results found.    Diagnostic study results (if any) were reviewed by [facility].    Assessment/Plan   Allergies, medications, history, and pertinent labs/EKGs/Imaging reviewed by Joby Quiroz PA-C.     Medical Decision Making  Patient's symptoms consistent with COVID-19.  Patient was positive for COVID at home.  Patient to treat acute upper respiratory infection symptoms with Flonase.  Patient educated to follow-up with primary care physician in 5 to 7 days if symptoms do not resolve.  If Patient develops any chest pain, shortness of breath or difficulty breathing she should immediately go to the emergency room for reevaluation.    Orders and Diagnoses  Diagnoses and all orders for this visit:  COVID  -     fluticasone (Flonase) 50 mcg/actuation nasal spray; Administer 1 spray into each nostril once daily. Shake gently. Before first use, prime pump. After use, clean tip and replace cap.      Medical Admin Record      Follow Up Instructions  No follow-ups on file.    Patient disposition: Home    Electronically signed by [facility]  2:38 PM

## 2024-09-10 NOTE — PATIENT INSTRUCTIONS
Patient positive for COVID in home.  Patient now 5 days of symptoms.  Patient symptoms consistent with acute upper respiratory infection.  Patient to take today and tomorrow off of work for rest.  Patient to use Flonase as prescribed for symptomatic control.  If patient develops chest pain, shortness of breath or difficulty breathing she should immediately go to the emergency room.  Please follow-up with your primary doctor in 5 to 7 days if symptoms do not resolve.

## 2024-10-22 ENCOUNTER — OFFICE VISIT (OUTPATIENT)
Dept: GASTROENTEROLOGY | Facility: CLINIC | Age: 34
End: 2024-10-22
Payer: COMMERCIAL

## 2024-10-22 VITALS
HEIGHT: 65 IN | WEIGHT: 111 LBS | SYSTOLIC BLOOD PRESSURE: 110 MMHG | DIASTOLIC BLOOD PRESSURE: 73 MMHG | BODY MASS INDEX: 18.49 KG/M2 | HEART RATE: 67 BPM | TEMPERATURE: 99.4 F

## 2024-10-22 DIAGNOSIS — K58.2 IRRITABLE BOWEL SYNDROME WITH BOTH CONSTIPATION AND DIARRHEA: Primary | ICD-10-CM

## 2024-10-22 DIAGNOSIS — R63.4 WEIGHT LOSS, UNINTENTIONAL: ICD-10-CM

## 2024-10-22 DIAGNOSIS — R13.10 DYSPHAGIA, UNSPECIFIED TYPE: ICD-10-CM

## 2024-10-22 PROCEDURE — 99212 OFFICE O/P EST SF 10 MIN: CPT | Performed by: INTERNAL MEDICINE

## 2024-10-22 PROCEDURE — 99202 OFFICE O/P NEW SF 15 MIN: CPT | Performed by: INTERNAL MEDICINE

## 2024-10-22 PROCEDURE — 3008F BODY MASS INDEX DOCD: CPT | Performed by: INTERNAL MEDICINE

## 2024-10-22 NOTE — LETTER
October 22, 2024     Patient: Hellen Stein   YOB: 1990   Date of Visit: 10/22/2024       To Whom It May Concern:    Hellen Stein was seen in my clinic on 10/22/2024 at 1:00 pm. Please excuse Hellen for her absence from work on this day to make the appointment.    If you have any questions or concerns, please don't hesitate to call.         Sincerely,         Evangelista Lu, DO        CC: No Recipients

## 2024-10-22 NOTE — PATIENT INSTRUCTIONS
Please add Metamucil to your program taking 1 tbs per day mixed in water.      I agree that you would benefit from stool testing and the upper endoscopy recommended by Kathy.

## 2024-10-22 NOTE — PROGRESS NOTES
"Subjective   Patient ID: Hellen Stein is a 34 y.o. female who presents for New Patient Visit.  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 [place]   Very active     She has abdominal pain in the left upper quadrant  Frequent constipation and feels lower back pain; less so distention  Tried no laxatives so far  BM daily in the morning; occasional no stool  Some days she notes diarrhea which she correlates with being \"backed up\"    Headaches came with over eating and felt better with emesis - last episode 1 month ago  Favorite foods - pasta  Avoids pizza   Eggs and ni or oatmeal for breakfast  Soup for lunch  Chicken for dinner   Woke up with pain and better after BM x 2    4 kids 9 to 16 years old  High school weight - 105 lbs to 115 lbs  Most - 150 after last child  Now 111 but happy at 120     Trouble swallowing for almost 1 year, especially large pills                    Review of Systems    Objective   Physical Exam  Constitutional:       Appearance: Normal appearance.   Pulmonary:      Effort: Pulmonary effort is normal.   Abdominal:      Palpations: Abdomen is soft.      Tenderness: There is abdominal tenderness.      Comments: Mostly symptomatic in RUQ but no mass or rebound noted.     Neurological:      Mental Status: She is alert.   Psychiatric:         Mood and Affect: Mood normal.         Behavior: Behavior normal.         Thought Content: Thought content normal.         Judgment: Judgment normal.         Assessment/Plan   Problem List Items Addressed This Visit             ICD-10-CM    Dysphagia, unspecified R13.10    Irritable bowel syndrome with both constipation and diarrhea - Primary K58.2    Weight loss, unintentional R63.4     Stool analysis and EGD to follow  Add psyllium fiber daily          Evangelista Lu DO 10/22/24 1:30 PM   "

## 2025-01-02 ENCOUNTER — OFFICE VISIT (OUTPATIENT)
Dept: OBGYN | Age: 35
End: 2025-01-02
Payer: COMMERCIAL

## 2025-01-02 VITALS
HEIGHT: 66 IN | HEART RATE: 62 BPM | WEIGHT: 113 LBS | SYSTOLIC BLOOD PRESSURE: 128 MMHG | TEMPERATURE: 97.9 F | OXYGEN SATURATION: 99 % | BODY MASS INDEX: 18.16 KG/M2 | DIASTOLIC BLOOD PRESSURE: 84 MMHG

## 2025-01-02 DIAGNOSIS — Z11.3 SCREENING EXAMINATION FOR STI: Primary | ICD-10-CM

## 2025-01-02 PROCEDURE — 99213 OFFICE O/P EST LOW 20 MIN: CPT | Performed by: OBSTETRICS & GYNECOLOGY

## 2025-01-02 PROCEDURE — 99395 PREV VISIT EST AGE 18-39: CPT | Performed by: OBSTETRICS & GYNECOLOGY

## 2025-01-02 RX ORDER — HYDROXYZINE PAMOATE 25 MG/1
25 CAPSULE ORAL 3 TIMES DAILY PRN
Qty: 50 CAPSULE | Refills: 0 | Status: SHIPPED | OUTPATIENT
Start: 2025-01-02

## 2025-01-02 SDOH — ECONOMIC STABILITY: FOOD INSECURITY: WITHIN THE PAST 12 MONTHS, YOU WORRIED THAT YOUR FOOD WOULD RUN OUT BEFORE YOU GOT MONEY TO BUY MORE.: NEVER TRUE

## 2025-01-02 SDOH — ECONOMIC STABILITY: INCOME INSECURITY: HOW HARD IS IT FOR YOU TO PAY FOR THE VERY BASICS LIKE FOOD, HOUSING, MEDICAL CARE, AND HEATING?: SOMEWHAT HARD

## 2025-01-02 SDOH — ECONOMIC STABILITY: FOOD INSECURITY: WITHIN THE PAST 12 MONTHS, THE FOOD YOU BOUGHT JUST DIDN'T LAST AND YOU DIDN'T HAVE MONEY TO GET MORE.: NEVER TRUE

## 2025-01-02 ASSESSMENT — ENCOUNTER SYMPTOMS
CONSTIPATION: 1
VOMITING: 0
WHEEZING: 0
SHORTNESS OF BREATH: 0
COUGH: 0
NAUSEA: 0
ABDOMINAL PAIN: 0
BLOOD IN STOOL: 0
DIARRHEA: 1

## 2025-01-02 ASSESSMENT — PATIENT HEALTH QUESTIONNAIRE - PHQ9
SUM OF ALL RESPONSES TO PHQ QUESTIONS 1-9: 4
SUM OF ALL RESPONSES TO PHQ QUESTIONS 1-9: 4
4. FEELING TIRED OR HAVING LITTLE ENERGY: SEVERAL DAYS
1. LITTLE INTEREST OR PLEASURE IN DOING THINGS: NOT AT ALL
8. MOVING OR SPEAKING SO SLOWLY THAT OTHER PEOPLE COULD HAVE NOTICED. OR THE OPPOSITE, BEING SO FIGETY OR RESTLESS THAT YOU HAVE BEEN MOVING AROUND A LOT MORE THAN USUAL: NOT AT ALL
SUM OF ALL RESPONSES TO PHQ QUESTIONS 1-9: 4
SUM OF ALL RESPONSES TO PHQ9 QUESTIONS 1 & 2: 0
5. POOR APPETITE OR OVEREATING: NEARLY EVERY DAY
2. FEELING DOWN, DEPRESSED OR HOPELESS: NOT AT ALL
9. THOUGHTS THAT YOU WOULD BE BETTER OFF DEAD, OR OF HURTING YOURSELF: NOT AT ALL
10. IF YOU CHECKED OFF ANY PROBLEMS, HOW DIFFICULT HAVE THESE PROBLEMS MADE IT FOR YOU TO DO YOUR WORK, TAKE CARE OF THINGS AT HOME, OR GET ALONG WITH OTHER PEOPLE: NOT DIFFICULT AT ALL
7. TROUBLE CONCENTRATING ON THINGS, SUCH AS READING THE NEWSPAPER OR WATCHING TELEVISION: NOT AT ALL
6. FEELING BAD ABOUT YOURSELF - OR THAT YOU ARE A FAILURE OR HAVE LET YOURSELF OR YOUR FAMILY DOWN: NOT AT ALL
3. TROUBLE FALLING OR STAYING ASLEEP: NOT AT ALL
SUM OF ALL RESPONSES TO PHQ QUESTIONS 1-9: 4

## 2025-01-02 NOTE — PROGRESS NOTES
Here today for her annual exam. Former patient of Dr. Cartagena.   Patient has complaints of pain and some bleeding after urination a few weeks ago. Just happened that one time and went away. Requesting a refill on her vistaril 25mg  Discharge instructions have been discussed with the patient. Patient advised to call our office with any questions or concerns.   Voiced understanding.  Health tracks obtained, labeled and sent to lab
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
## 2025-01-03 DIAGNOSIS — Z11.3 SCREENING EXAMINATION FOR STI: ICD-10-CM

## 2025-01-06 ENCOUNTER — TELEPHONE (OUTPATIENT)
Dept: OBGYN | Age: 35
End: 2025-01-06

## 2025-01-06 NOTE — TELEPHONE ENCOUNTER
Attempted to call the patient to inform her about her upcoming chemo education appointment on 12/7/22 using  [facility] id:[id number] but patient's mailbox is full and not accepting any new messages. Called myself and sent an SMS notification to the patient's number.    Patient called back after I left her a vm. States she wants treated for her bv because she is having pelvic pain. Advised her that it is most likely not the cause. She states she is having odor with her discharge and would like the bv treated. I advised her I would pass the message along and that even if treated, it will most likely come back and is present in 40% of women. Verbalized understanding and still wants treated.

## 2025-01-07 RX ORDER — METRONIDAZOLE 500 MG/1
500 TABLET ORAL 2 TIMES DAILY
Qty: 14 TABLET | Refills: 0 | Status: SHIPPED | OUTPATIENT
Start: 2025-01-07 | End: 2025-01-14

## 2025-01-31 ENCOUNTER — APPOINTMENT (OUTPATIENT)
Dept: PRIMARY CARE | Facility: CLINIC | Age: 35
End: 2025-01-31
Payer: COMMERCIAL

## 2025-01-31 VITALS
BODY MASS INDEX: 19.14 KG/M2 | HEART RATE: 73 BPM | OXYGEN SATURATION: 98 % | SYSTOLIC BLOOD PRESSURE: 116 MMHG | WEIGHT: 115 LBS | DIASTOLIC BLOOD PRESSURE: 60 MMHG

## 2025-01-31 DIAGNOSIS — R63.4 WEIGHT LOSS, UNINTENTIONAL: ICD-10-CM

## 2025-01-31 DIAGNOSIS — M25.512 CHRONIC LEFT SHOULDER PAIN: Primary | ICD-10-CM

## 2025-01-31 DIAGNOSIS — R13.10 DYSPHAGIA, UNSPECIFIED TYPE: ICD-10-CM

## 2025-01-31 DIAGNOSIS — R53.82 CHRONIC FATIGUE: ICD-10-CM

## 2025-01-31 DIAGNOSIS — R53.83 FATIGUE, UNSPECIFIED TYPE: ICD-10-CM

## 2025-01-31 DIAGNOSIS — R79.0 ABNORMAL BLOOD LEVEL OF IRON: ICD-10-CM

## 2025-01-31 DIAGNOSIS — Z00.00 PREVENTATIVE HEALTH CARE: ICD-10-CM

## 2025-01-31 DIAGNOSIS — F41.9 ANXIETY: ICD-10-CM

## 2025-01-31 DIAGNOSIS — R79.89 LOW VITAMIN D LEVEL: ICD-10-CM

## 2025-01-31 DIAGNOSIS — M25.50 POLYARTHRALGIA: ICD-10-CM

## 2025-01-31 DIAGNOSIS — R63.4 WEIGHT LOSS: ICD-10-CM

## 2025-01-31 DIAGNOSIS — E53.8 LOW SERUM VITAMIN B12: ICD-10-CM

## 2025-01-31 DIAGNOSIS — G89.29 CHRONIC LEFT SHOULDER PAIN: Primary | ICD-10-CM

## 2025-01-31 PROCEDURE — 99214 OFFICE O/P EST MOD 30 MIN: CPT | Performed by: FAMILY MEDICINE

## 2025-01-31 PROCEDURE — 1036F TOBACCO NON-USER: CPT | Performed by: FAMILY MEDICINE

## 2025-01-31 RX ORDER — ESCITALOPRAM OXALATE 5 MG/1
5 TABLET ORAL DAILY
Qty: 30 TABLET | Refills: 2 | Status: SHIPPED | OUTPATIENT
Start: 2025-01-31 | End: 2025-05-01

## 2025-01-31 ASSESSMENT — PATIENT HEALTH QUESTIONNAIRE - PHQ9
SUM OF ALL RESPONSES TO PHQ9 QUESTIONS 1 AND 2: 1
2. FEELING DOWN, DEPRESSED OR HOPELESS: SEVERAL DAYS
1. LITTLE INTEREST OR PLEASURE IN DOING THINGS: NOT AT ALL
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
## 2025-01-31 NOTE — LETTER
January 31, 2025     Patient: Hellen Stein   YOB: 1990   Date of Visit: 1/31/2025       To Whom It May Concern:    Hellen Stein was seen in my clinic on 1/31/2025 at 11:20 am. Please excuse eHllen for her absence from work on this day to make the appointment.    If you have any questions or concerns, please don't hesitate to call.         Sincerely,         Meredith Juarez, DO        CC: No Recipients

## 2025-01-31 NOTE — PATIENT INSTRUCTIONS
Please get updated blood work     Get xrays of the left shoulder     Referral to Dr Waters to get the upper scope done.     Try a very low dose of lexapro in the AM     Follow up in 2-3 months for follow up

## 2025-03-16 LAB
25(OH)D3+25(OH)D2 SERPL-MCNC: 30 NG/ML (ref 30–100)
ALBUMIN SERPL-MCNC: 4.7 G/DL (ref 3.6–5.1)
ALP SERPL-CCNC: 50 U/L (ref 31–125)
ALT SERPL-CCNC: 27 U/L (ref 6–29)
ANA SER QL IF: NORMAL
ANION GAP SERPL CALCULATED.4IONS-SCNC: 8 MMOL/L (CALC) (ref 7–17)
AST SERPL-CCNC: 22 U/L (ref 10–30)
BASOPHILS # BLD AUTO: 21 CELLS/UL (ref 0–200)
BASOPHILS NFR BLD AUTO: 0.5 %
BILIRUB SERPL-MCNC: 0.9 MG/DL (ref 0.2–1.2)
BUN SERPL-MCNC: 14 MG/DL (ref 7–25)
CALCIUM SERPL-MCNC: 9.7 MG/DL (ref 8.6–10.2)
CHLORIDE SERPL-SCNC: 104 MMOL/L (ref 98–110)
CHOLEST SERPL-MCNC: 163 MG/DL
CHOLEST/HDLC SERPL: 3.3 (CALC)
CO2 SERPL-SCNC: 28 MMOL/L (ref 20–32)
CREAT SERPL-MCNC: 0.82 MG/DL (ref 0.5–0.97)
EGFRCR SERPLBLD CKD-EPI 2021: 97 ML/MIN/1.73M2
EOSINOPHIL # BLD AUTO: 62 CELLS/UL (ref 15–500)
EOSINOPHIL NFR BLD AUTO: 1.5 %
ERYTHROCYTE [DISTWIDTH] IN BLOOD BY AUTOMATED COUNT: 12.2 % (ref 11–15)
GLUCOSE SERPL-MCNC: 88 MG/DL (ref 65–99)
HCT VFR BLD AUTO: 45.1 % (ref 35–45)
HDLC SERPL-MCNC: 49 MG/DL
HGB BLD-MCNC: 14.8 G/DL (ref 11.7–15.5)
IRON SATN MFR SERPL: 59 % (CALC) (ref 16–45)
IRON SERPL-MCNC: 169 MCG/DL (ref 40–190)
LDLC SERPL CALC-MCNC: 100 MG/DL (CALC)
LYMPHOCYTES # BLD AUTO: 1714 CELLS/UL (ref 850–3900)
LYMPHOCYTES NFR BLD AUTO: 41.8 %
MCH RBC QN AUTO: 30.2 PG (ref 27–33)
MCHC RBC AUTO-ENTMCNC: 32.8 G/DL (ref 32–36)
MCV RBC AUTO: 92 FL (ref 80–100)
MONOCYTES # BLD AUTO: 410 CELLS/UL (ref 200–950)
MONOCYTES NFR BLD AUTO: 10 %
NEUTROPHILS # BLD AUTO: 1894 CELLS/UL (ref 1500–7800)
NEUTROPHILS NFR BLD AUTO: 46.2 %
NONHDLC SERPL-MCNC: 114 MG/DL (CALC)
PLATELET # BLD AUTO: 287 THOUSAND/UL (ref 140–400)
PMV BLD REES-ECKER: 10.6 FL (ref 7.5–12.5)
POTASSIUM SERPL-SCNC: 4 MMOL/L (ref 3.5–5.3)
PROT SERPL-MCNC: 7.4 G/DL (ref 6.1–8.1)
RBC # BLD AUTO: 4.9 MILLION/UL (ref 3.8–5.1)
SODIUM SERPL-SCNC: 140 MMOL/L (ref 135–146)
TIBC SERPL-MCNC: 288 MCG/DL (CALC) (ref 250–450)
TRIGL SERPL-MCNC: 58 MG/DL
TSH SERPL-ACNC: 1.37 MIU/L
VIT B12 SERPL-MCNC: 304 PG/ML (ref 200–1100)
WBC # BLD AUTO: 4.1 THOUSAND/UL (ref 3.8–10.8)

## 2025-03-18 LAB
25(OH)D3+25(OH)D2 SERPL-MCNC: 30 NG/ML (ref 30–100)
ALBUMIN SERPL-MCNC: 4.7 G/DL (ref 3.6–5.1)
ALP SERPL-CCNC: 50 U/L (ref 31–125)
ALT SERPL-CCNC: 27 U/L (ref 6–29)
ANA PAT SER IF-IMP: ABNORMAL
ANA PAT SER IF-IMP: ABNORMAL
ANA SER QL IF: POSITIVE
ANA TITR SER IF: ABNORMAL TITER
ANA TITR SER IF: ABNORMAL TITER
ANION GAP SERPL CALCULATED.4IONS-SCNC: 8 MMOL/L (CALC) (ref 7–17)
AST SERPL-CCNC: 22 U/L (ref 10–30)
BASOPHILS # BLD AUTO: 21 CELLS/UL (ref 0–200)
BASOPHILS NFR BLD AUTO: 0.5 %
BILIRUB SERPL-MCNC: 0.9 MG/DL (ref 0.2–1.2)
BUN SERPL-MCNC: 14 MG/DL (ref 7–25)
CALCIUM SERPL-MCNC: 9.7 MG/DL (ref 8.6–10.2)
CHLORIDE SERPL-SCNC: 104 MMOL/L (ref 98–110)
CHOLEST SERPL-MCNC: 163 MG/DL
CHOLEST/HDLC SERPL: 3.3 (CALC)
CO2 SERPL-SCNC: 28 MMOL/L (ref 20–32)
CREAT SERPL-MCNC: 0.82 MG/DL (ref 0.5–0.97)
DSDNA AB SER-ACNC: <1 IU/ML
EGFRCR SERPLBLD CKD-EPI 2021: 97 ML/MIN/1.73M2
ENA JO1 AB SER IA-ACNC: ABNORMAL AI
ENA RNP AB SER-ACNC: ABNORMAL AI
ENA SCL70 AB SER IA-ACNC: ABNORMAL AI
ENA SM AB SER IA-ACNC: ABNORMAL AI
ENA SM+RNP AB SER IA-ACNC: ABNORMAL AI
ENA SS-A AB SER IA-ACNC: ABNORMAL AI
ENA SS-B AB SER IA-ACNC: ABNORMAL AI
EOSINOPHIL # BLD AUTO: 62 CELLS/UL (ref 15–500)
EOSINOPHIL NFR BLD AUTO: 1.5 %
ERYTHROCYTE [DISTWIDTH] IN BLOOD BY AUTOMATED COUNT: 12.2 % (ref 11–15)
GLUCOSE SERPL-MCNC: 88 MG/DL (ref 65–99)
HCT VFR BLD AUTO: 45.1 % (ref 35–45)
HDLC SERPL-MCNC: 49 MG/DL
HGB BLD-MCNC: 14.8 G/DL (ref 11.7–15.5)
IRON SATN MFR SERPL: 59 % (CALC) (ref 16–45)
IRON SERPL-MCNC: 169 MCG/DL (ref 40–190)
LABORATORY COMMENT REPORT: ABNORMAL
LDLC SERPL CALC-MCNC: 100 MG/DL (CALC)
LYMPHOCYTES # BLD AUTO: 1714 CELLS/UL (ref 850–3900)
LYMPHOCYTES NFR BLD AUTO: 41.8 %
MCH RBC QN AUTO: 30.2 PG (ref 27–33)
MCHC RBC AUTO-ENTMCNC: 32.8 G/DL (ref 32–36)
MCV RBC AUTO: 92 FL (ref 80–100)
MONOCYTES # BLD AUTO: 410 CELLS/UL (ref 200–950)
MONOCYTES NFR BLD AUTO: 10 %
NEUTROPHILS # BLD AUTO: 1894 CELLS/UL (ref 1500–7800)
NEUTROPHILS NFR BLD AUTO: 46.2 %
NONHDLC SERPL-MCNC: 114 MG/DL (CALC)
NUCLEOSOME AB SER IA-ACNC: ABNORMAL AI
PLATELET # BLD AUTO: 287 THOUSAND/UL (ref 140–400)
PMV BLD REES-ECKER: 10.6 FL (ref 7.5–12.5)
POTASSIUM SERPL-SCNC: 4 MMOL/L (ref 3.5–5.3)
PROT SERPL-MCNC: 7.4 G/DL (ref 6.1–8.1)
RBC # BLD AUTO: 4.9 MILLION/UL (ref 3.8–5.1)
SODIUM SERPL-SCNC: 140 MMOL/L (ref 135–146)
TIBC SERPL-MCNC: 288 MCG/DL (CALC) (ref 250–450)
TRIGL SERPL-MCNC: 58 MG/DL
TSH SERPL-ACNC: 1.37 MIU/L
VIT B12 SERPL-MCNC: 304 PG/ML (ref 200–1100)
WBC # BLD AUTO: 4.1 THOUSAND/UL (ref 3.8–10.8)

## 2025-03-24 DIAGNOSIS — M25.50 POLYARTHRALGIA: Primary | ICD-10-CM

## 2025-03-24 DIAGNOSIS — M35.03 SJOGREN'S SYNDROME WITH MYOPATHY (MULTI): ICD-10-CM

## 2025-03-24 DIAGNOSIS — R53.82 CHRONIC FATIGUE: ICD-10-CM

## 2025-03-24 DIAGNOSIS — R63.4 WEIGHT LOSS: ICD-10-CM

## 2025-03-31 ENCOUNTER — APPOINTMENT (OUTPATIENT)
Dept: PRIMARY CARE | Facility: CLINIC | Age: 35
End: 2025-03-31
Payer: COMMERCIAL

## 2025-03-31 VITALS — BODY MASS INDEX: 19.47 KG/M2 | WEIGHT: 117 LBS | SYSTOLIC BLOOD PRESSURE: 124 MMHG | DIASTOLIC BLOOD PRESSURE: 74 MMHG

## 2025-03-31 DIAGNOSIS — M54.2 NECK PAIN: Primary | ICD-10-CM

## 2025-03-31 DIAGNOSIS — F41.9 ANXIETY: ICD-10-CM

## 2025-03-31 PROCEDURE — 1036F TOBACCO NON-USER: CPT | Performed by: FAMILY MEDICINE

## 2025-03-31 PROCEDURE — 99214 OFFICE O/P EST MOD 30 MIN: CPT | Performed by: FAMILY MEDICINE

## 2025-03-31 RX ORDER — BUSPIRONE HYDROCHLORIDE 5 MG/1
5 TABLET ORAL 2 TIMES DAILY
Qty: 60 TABLET | Refills: 0 | Status: SHIPPED | OUTPATIENT
Start: 2025-03-31 | End: 2025-04-30

## 2025-03-31 ASSESSMENT — PATIENT HEALTH QUESTIONNAIRE - PHQ9
1. LITTLE INTEREST OR PLEASURE IN DOING THINGS: NOT AT ALL
SUM OF ALL RESPONSES TO PHQ9 QUESTIONS 1 AND 2: 0
2. FEELING DOWN, DEPRESSED OR HOPELESS: NOT AT ALL
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
## 2025-03-31 NOTE — PATIENT INSTRUCTIONS
Labs on 3/15/25 were reviewed - Blood counts look good, iron is not on the high normal side, so you can reduce iron supplementation.  Cholesterol is ok. Positive MICKY with an elevated marker for sjogrens. Normal electrolytes, liver, kidney and thyroid levels. Vitamin D is borderline low so please start taking at least 4000 international units OTC daily.   B12 is also borderline low at 304, so you could take a daily OTC B12 . Referral placed to see rheumatology- Dr Neil, Dr Tan, would be good.     Get xrays of the left shoulder     Order for HLD B27 too.     Glad you saw Dr Waters to get the upper scope and dilation , glad this helps     Try a very low dose of buspar as needed for anxiety.     Follow up in 3 months for follow up

## 2025-03-31 NOTE — LETTER
March 31, 2025     Patient: Hellen Stein   YOB: 1990   Date of Visit: 3/31/2025       To Whom It May Concern:    Hellen Stein was seen in my clinic on 3/31/2025 at 3:40 pm. Please excuse Hellen for her absence from work on this day to make the appointment.    If you have any questions or concerns, please don't hesitate to call.         Sincerely,       Meredith Juarez DO, MSMEd, ABOM  Board Certified Family Physician   [address] [place] [address] [address]   [id number]  [place] [address]  P: [phone]  F: [phone]

## 2025-05-09 ENCOUNTER — HOSPITAL ENCOUNTER (OUTPATIENT)
Dept: RADIOLOGY | Facility: HOSPITAL | Age: 35
Discharge: HOME | End: 2025-05-09
Payer: COMMERCIAL

## 2025-05-09 DIAGNOSIS — G89.29 CHRONIC LEFT SHOULDER PAIN: ICD-10-CM

## 2025-05-09 DIAGNOSIS — M25.512 CHRONIC LEFT SHOULDER PAIN: ICD-10-CM

## 2025-05-09 PROCEDURE — 73030 X-RAY EXAM OF SHOULDER: CPT | Mod: LT

## 2025-05-12 LAB
HLA-B27 QL NAA+PROBE: NEGATIVE
QUEST HLAB27 TYPING RESULTS REVIEWED BY:: NORMAL

## 2025-06-30 ENCOUNTER — APPOINTMENT (OUTPATIENT)
Dept: PRIMARY CARE | Facility: CLINIC | Age: 35
End: 2025-06-30
Payer: COMMERCIAL

## 2025-06-30 VITALS
HEART RATE: 56 BPM | OXYGEN SATURATION: 96 % | SYSTOLIC BLOOD PRESSURE: 118 MMHG | BODY MASS INDEX: 18.8 KG/M2 | WEIGHT: 113 LBS | DIASTOLIC BLOOD PRESSURE: 72 MMHG

## 2025-06-30 DIAGNOSIS — M54.2 NECK PAIN: Primary | ICD-10-CM

## 2025-06-30 DIAGNOSIS — G89.29 CHRONIC LEFT SHOULDER PAIN: ICD-10-CM

## 2025-06-30 DIAGNOSIS — M25.512 CHRONIC LEFT SHOULDER PAIN: ICD-10-CM

## 2025-06-30 PROCEDURE — 99214 OFFICE O/P EST MOD 30 MIN: CPT | Performed by: FAMILY MEDICINE

## 2025-06-30 PROCEDURE — 1036F TOBACCO NON-USER: CPT | Performed by: FAMILY MEDICINE

## 2025-06-30 NOTE — PATIENT INSTRUCTIONS
Referral placed to see rheumatology- Dr Neil, Dr Tan, would be good.     We reviewed the xrays of the left shoulder. - Xray 5/9/25- Left shoulder looks good from a bone standpoint, likely ligament, tendon and/or muscle injury. .. Or could be nerve damage from the neck.. you can try PT and then we would get MRI of the shoulder and maybe nerve testing.   - MRI cervical spine on 12/11/28 showed Left-sided foraminal narrowing at C4/C5 and C5/C     Referral to PT for the neck and left shoulder for now, and then we might be able to try PT for the lower back and right hip.     There is [facility] [address], [place] [address]  Phone: [phone]  --OR--  [facility] [address], [place] [address]  Phone: [phone]    Glad you saw Dr Waters to get the upper scope and dilation , glad this helps     Follow up in 6 months for follow up

## 2025-06-30 NOTE — LETTER
June 30, 2025     Patient: Hellen Setin   YOB: 1990   Date of Visit: 6/30/2025       To Whom It May Concern:    Hellen Stein was seen in my clinic on 6/30/2025 at 2:20 pm. Please excuse Hellen for her absence from work on this day to make the appointment.    If you have any questions or concerns, please don't hesitate to call.         Sincerely,         Meredith Juarez DO, MSMEd, ABOM  Board Certified Family Physician   [address] [place] [address] Rd[address]   [id number]  [place] [address]  P: [phone]  F: [phone]        CC: No Recipients
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
## 2025-09-15 ENCOUNTER — APPOINTMENT (OUTPATIENT)
Dept: PRIMARY CARE | Facility: CLINIC | Age: 35
End: 2025-09-15
Payer: COMMERCIAL

## 2026-01-05 ENCOUNTER — APPOINTMENT (OUTPATIENT)
Dept: PRIMARY CARE | Facility: CLINIC | Age: 36
End: 2026-01-05
Payer: COMMERCIAL